# Patient Record
Sex: FEMALE | Race: OTHER | HISPANIC OR LATINO | Employment: PART TIME | URBAN - METROPOLITAN AREA
[De-identification: names, ages, dates, MRNs, and addresses within clinical notes are randomized per-mention and may not be internally consistent; named-entity substitution may affect disease eponyms.]

---

## 2018-02-20 ENCOUNTER — HOSPITAL ENCOUNTER (EMERGENCY)
Facility: HOSPITAL | Age: 23
Discharge: HOME | End: 2018-02-20
Admitting: EMERGENCY MEDICINE
Payer: COMMERCIAL

## 2018-02-20 VITALS
OXYGEN SATURATION: 99 % | TEMPERATURE: 98.1 F | DIASTOLIC BLOOD PRESSURE: 67 MMHG | SYSTOLIC BLOOD PRESSURE: 117 MMHG | WEIGHT: 191.25 LBS | RESPIRATION RATE: 20 BRPM | HEART RATE: 114 BPM

## 2018-02-20 DIAGNOSIS — J06.9 VIRAL UPPER RESPIRATORY ILLNESS: Primary | ICD-10-CM

## 2018-02-20 PROCEDURE — 99283 EMERGENCY DEPT VISIT LOW MDM: CPT

## 2018-02-20 RX ORDER — FLUTICASONE PROPIONATE 50 MCG
SPRAY, SUSPENSION (ML) NASAL
Qty: 16 G | Refills: 0 | Status: SHIPPED | OUTPATIENT
Start: 2018-02-20

## 2018-02-20 RX ORDER — LEVOTHYROXINE SODIUM 0.07 MG/1
75 TABLET ORAL DAILY
COMMUNITY

## 2018-02-20 RX ORDER — IBUPROFEN 600 MG/1
600 TABLET ORAL EVERY 6 HOURS PRN
Qty: 30 TABLET | Refills: 0 | Status: SHIPPED | OUTPATIENT
Start: 2018-02-20 | End: 2018-03-02

## 2018-02-20 RX ORDER — BENZONATATE 100 MG/1
100 CAPSULE ORAL 3 TIMES DAILY PRN
Qty: 20 CAPSULE | Refills: 0 | Status: SHIPPED | OUTPATIENT
Start: 2018-02-20 | End: 2018-02-27

## 2018-02-20 RX ORDER — GUAIFENESIN 600 MG
1200 TABLET, EXTENDED RELEASE 12 HR ORAL EVERY 12 HOURS SCHEDULED
Qty: 20 TABLET | Refills: 0 | Status: SHIPPED | OUTPATIENT
Start: 2018-02-20

## 2018-02-20 NOTE — DISCHARGE INSTRUCTIONS
Tylenol or Motrin for fevers/pain  Saline spray for congestion you may use Mucinex for cough and congestion increase, fluids follow-up with the family doctor  Return to the emergency department for worsening symptoms  Síndrome viral   LO QUE NECESITA SABER:   El síndrome viral es un término general usado para describir adelina infección viral que no tiene adelina causa definida  Los virus son propagados fácilmente de Alberteen Simeon persona a otra a través del aire y [place] los objetos que se comparten  INSTRUCCIONES SOBRE [facility]ITAL[facility]:   Llame al 911 en alba de presentar lo siguiente:   · Usted sufre adelina convulsión  · No es posible despertarlo  · Usted tiene dolor torácico y dificultad para respirar  Regrese a la erick de emergencias si:   · Usted tiene rigidez en el daniel, dolor de roselia intenso y sensibilidad a la rangel  · Usted se siente débil, mareado o confundido  · Usted paulie de orinar u orina menos de lo normal      · Usted tose vladimir o adelina mucosidad espesa amarilla o erna  · Usted tiene dolor abdominal severo o bey abdomen está más armen de lo habitual   Pregúntele a bey médico qué vitaminas y minerales son adecuados para usted  · Jocelynn síntomas no mejoran con el tratamiento o empeoran después de 3 días  · Usted tiene salpullido o dolor de oído  · Usted siente ardor al Jacqlyn Peels  · Usted tiene preguntas o inquietudes acerca de bey condición o cuidado  Medicamentos:  Es posible que usted necesite alguno de los siguientes:  · El acetaminofén  arun el dolor y baja la fiebre  Está disponible sin receta médica  Pregunte cuánto medicamento debe neil y con qué frecuencia  [address]  El acetaminofén puede causar daño en el hígado cuando no se carmen de forma correcta  · AINEs (Analgésicos antiinflamatorios no esteroides) candace el ibuprofeno, ayudan a disminuir la inflamación, el dolor y la [place]   Los AINEs pueden causar sangrado estomacal o problemas renales en ciertas personas  Si usted carmen un medicamento anticoagulante, siempre pregúntele a bey médico si los WANDY son seguros para usted  Siempre damian la etiqueta de dano medicamento y [place] instrucciones  · El medicamento para el resfriado  ayuda a disminuir la inflamación, a controlar la tos y a aliviar la congestión del pecho o nasal      · El rociador nasal de agua salina  ayuda a disminuir la congestión nasal      · Allegan prince medicamentos candace se le haya indicado  Consulte con bey médico si usted leonila que bey medicamento no le está ayudando o si presenta efectos secundarios  Infórmele si es alérgico a algún medicamento  Mantenga adelina lista actualizada de los [place], las vitaminas y los productos herbales que carmen  Incluya los siguientes datos de los medicamentos: cantidad, frecuencia y motivo de administración  Traiga con usted la lista o los envases de la píldoras a prince citas de seguimiento  Lleve la lista de los medicamentos con usted en alba de adelina emergencia  El manejo de bey síntomas:   · Consuma líquidos según le indicaron  para evitar la deshidratación  Pregunte cuánto líquido debe neil cada día y cuáles líquidos son los más adecuados para usted  Pregunte si usted debería neil adelina solución de rehidratación oral (SRO)  [address] cantidades exactas de agua, sal y azúcar que usted necesita para reemplazar los líquidos corporales  [place] podría ayudarlo a evitar la deshidratación a causa del vómito y de la diarrea  No tome líquidos con cafeína  Los líquidos con cafeína pueden empeorar la deshidratación  · Descanse lo suficiente  para ayudar a que bey cuerpo sane  Allegan siestas mani el día  Pregunte a bey médico cuándo puede regresar a bey trabajo y a prince actividades cotidianas  · Use un humidificador de suzie frío  para ayudarlo a respirar más fácilmente si usted tiene congestión nasal o del pecho  Pregunte a bey médico cómo usar un humidificador de vapor frío       · Coma miel o use caramelos para la tos  para ayudar a disminuir la molestia de la garganta  Pregunte a vasques médico cuánta miel debería comer cada día  Los caramelos para la tos están disponibles sin receta médica  Siga las indicaciones para neil los caramelos para la tos  · No fume y permanezca lejos de otras personas que fuman  La nicotina y otras sustancias químicas que contienen los cigarrillos y cigarros pueden dañar los pulmones  El fumar también puede retrasar la sanación  Pida información a vasques médico si usted actualmente fuma y necesita ayuda para dejar de fumar  Los cigarrillos electrónicos o tabaco sin humo todavía contienen nicotina  Consulte con vasques médico antes de [employer]  · Lávese las socrates frecuentemente  para evitar la propagación de gérmenes a otras personas  Utilice agua y [place]  Use gel antibacterial cuando no tenga jabón ni agua disponibles  [employer] las socraets después de usar el baño, toser o estornudar  Lávese las socrates antes de comer o preparar alimentos  Acuda a prince consultas de control con vasques médico según le indicaron  Anote prince preguntas para que se acuerde de hacerlas mani prince visitas  © 2017 [address]  Information is for End User's use only and may not be sold, redistributed or otherwise used for commercial purposes  All illustrations and images included in CareNotes® are the copyrighted property of A D A M , Inc  or Luis Alfredo Herr  Esta información es sólo para uso en educación  Vasques intención no es darle un consejo médico sobre enfermedades o tratamientos  Colsulte con vasques Burlene [place] farmacéutico antes de seguir cualquier régimen médico para saber si es seguro y efectivo para usted  Infección respiratoria superior   LO QUE NECESITA SABER:   Alisa infección respiratoria superior también se conoce candace el resfriado común  Micki es alisa infección que puede afectar vasques nariz, garganta, oídos y los senos frontales   Para personas saludables, el resfriado común generalmente no es grave y no requiere tratamiento especial  Los síntomas del resfriado generalmente son Suellyn Sjogren graves mani los primeros 3 a 5 días  [address] en 7 a 14 días  Puede que usted siga tosiendo por 2 a 3 semanas  Los resfriados son provocados por virus y no mejoran con antibióticos  INSTRUCCIONES SOBRE [facility]ITAL[facility]:   Regrese a la erick de emergencias si:   · Usted tiene dolor torácico y dificultad para respirar  Pregúntele a bey Tiffany Pel vitaminas y minerales son adecuados para usted  · Usted tiene fiebre de más de 102ºF [facility])  · Bey garganta irritada empeora o usted ve manchas chantal o hugo en bey garganta  · Jocelynn síntomas empeoran después de 3 a 5 días o bey resfriado no mejora en 14 días  · Usted tiene sarpullido en alguna parte de bey piel  · Usted tiene bultos grandes y sensible en bey daniel    · Usted tiene secreción espesa de color erna o amarillo proveniente de bey nariz  · Usted expectora mucosidad de color amarillo, erna o con [place]  · Usted vomita por más de 24 horas y no puede retener líquidos en el estómago  · Usted tiene un grave dolor de oído  · Usted tiene preguntas o inquietudes acerca de bey condición o cuidado  Medicamentos:  Es posible que usted necesite alguno de los siguientes:  · Los descongestionantes  ayudan a reducir la congestión nasal y Jessenia Ganser a respirar más fácilmente  Si carmen pastillas descongestionantes, pueden causarle agitación o problemas para dormir  No use aerosol descongestionante por más de unos cuantos días  · Los jarabes para la tos  ayudan a reducir la tos  Pregúntele a bey médico cuál tipo de medicamento para la tos es mejor para usted  · AINEs (Analgésicos antiinflamatorios no esteroides) candace el ibuprofeno, ayudan a disminuir la inflamación, el dolor y la [place]  Los AINEs pueden causar sangrado estomacal o problemas renales en ciertas personas   Si usted carmen un medicamento anticoagulante, siempre pregúntele a bey médico si los WANDY son seguros para usted  Siempre kavya la etiqueta de dano medicamento y [place] instrucciones  · Acetaminofeno:  arun el dolor y baja la fiebre  Está disponible sin receta médica  Pregunte la cantidad y la frecuencia con que debe tomarlos  [address]  Kavya las etiquetas de todos los demás medicamentos que esté usando para saber si también contienen acetaminofén, o pregunte a bey médico o farmacéutico  El acetaminofén puede causar daño en el hígado cuando no se carmen de forma correcta  No use más de 4 gramos (4000 miligramos) en total de acetaminofeno en un día  · Speed prince medicamentos candace se le haya indicado  Consulte con bey médico si usted leonila que bey medicamento no le está ayudando o si presenta efectos secundarios  Infórmele si es alérgico a cualquier medicamento  Mantenga adelina lista actualizada de los [place], las vitaminas y los productos herbales que carmen  Incluya los siguientes datos de los medicamentos: cantidad, frecuencia y motivo de administración  Traiga con usted la lista o los envases de la píldoras a prince citas de seguimiento  Lleve la lista de los medicamentos con usted en alba de adelina emergencia  Acuda a prince consultas de control con bey médico según le indicaron  Anote prince preguntas para que se acuerde de hacerlas mani prince visitas  Cuidados personales:   · Descanse el mayor tiempo posible  Empiece a hacer un poco más día a día  · [employer] líquidos candace se le indique  Los líquidos le ayudarán a aflojar y disolver la mucosidad para que la pueda expectorar  Los líquidos ayudarán a evitar la deshidratación  Los líquidos que ayudan a prevenir la deshidratación pueden ser [place] y caldo  No tome líquidos que contienen cafeína  La cafeína puede aumentar el riesgo de deshidratación  Pregúntele a bey médico cuál es la cantidad de líquido que necesita ingerir a diario  · Alivie el dolor de garganta  Cassidy gárgaras de agua tibia con sal  Primera ayuda a aliviar el dolor de garganta  Prepare agua salina disolviendo ¼ de cucharada de sal a 1 taza de agua tibia  Usted puede también chupar dulces duros o pastillas para la garganta  Usted puede usar aerosol para el dolor de garganta  · Use un humidificador o vaporizador  Use un humidificador de suzie frío o un vaporizador para elevar la humedad en bey casa  Primera podría ayudarle a respirar más fácilmente y al mismo tiempo disminuir la tos  · Use gotas nasales de solución salina candace se le haya indicado  Estas ayudan a [place]  · Aplique vaselina en la parte externa alrededor de las fosas nasales  Esta puede disminuir la irritación de sonarse la nariz  · No fume  La nicotina y otros químicos en los cigarrillos y cigarros pueden empeorar prince síntomas  También pueden causar infecciones candace la bronquitis o la [place]  Pida información a bey médico si usted actualmente fuma y necesita ayuda para dejar de fumar  Los cigarrillos electrónicos o tabaco sin humo todavía contienen nicotina  Consulte con bey médico antes de [employer]  Evite transmitir bey resfriado a los demás:   · Trate de mantenerse alejado de otras personas mani los primeros 2 a 3 días de bey resfriado cuando éste es más fácil de transmitir  · No comparta alimentos o bebidas  · No comparta toallas de mano con otros miembros de zeny en el hogar  · [employer] frecuentemente, especialmente después de sonarse la nariz  Karan la espalda a otras personas y cúbrase la boca y la nariz con un pañuelo desechable cuando estornuda o tose  © 2017 [address] [place] Information is for End User's use only and may not be sold, redistributed or otherwise used for commercial purposes  All illustrations and images included in CareNotes® are the copyrighted property of A D A M , Inc  or Luis Alfredo Herr    Esta información es sólo para uso en educación  Bey intención no es darle un consejo médico sobre enfermedades o tratamientos  Colsulte con bey Alejandro Sen farmacéutico antes de seguir cualquier régimen médico para saber si es seguro y efectivo para usted

## 2018-02-20 NOTE — ED PROVIDER NOTES
History  Chief Complaint   Patient presents with    Cough     Started one week ago  Patient presents to the emergency department with upper respiratory symptoms for 1 week  Patient states that she is coughing congestion and has a mild sore throat  She has not taken anything for her symptoms  She is not having any fevers  No chest pain or abdominal pain no GI upset eating and drinking well  Last menstrual cycle was on the 4th and denies chance of pregnancy  Prior to Admission Medications   Prescriptions Last Dose Informant Patient Reported? Taking?   levothyroxine 75 mcg tablet   Yes Yes   Sig: Take 75 mcg by mouth daily      Facility-Administered Medications: None       Past Medical History:   Diagnosis Date    Disease of thyroid gland     Hypothyroidism        History reviewed  No pertinent surgical history  History reviewed  No pertinent family history  I have reviewed and agree with the history as documented  Social History   Substance Use Topics    Smoking status: Never Smoker    Smokeless tobacco: Never Used    Alcohol use No        Review of Systems   All other systems reviewed and are negative  Physical Exam  ED Triage Vitals [02/20/18 1133]   Temperature Pulse Respirations Blood Pressure SpO2   98 1 °F (36 7 °C) (!) 114 20 117/67 99 %      Temp Source Heart Rate Source Patient Position - Orthostatic VS BP Location FiO2 (%)   Oral -- Sitting Right arm --      Pain Score       No Pain           Orthostatic Vital Signs  Vitals:    02/20/18 1133   BP: 117/67   Pulse: (!) 114   Patient Position - Orthostatic VS: Sitting       Physical Exam   Constitutional: She is oriented to person, place, and time  She appears well-developed and well-nourished  HENT:   Head: Normocephalic and atraumatic     Right Ear: External ear normal    Left Ear: External ear normal    Mouth/Throat: Oropharynx is clear and moist    Clear nasal rhinorrhea   Eyes: Conjunctivae and EOM are normal  Neck: Neck supple  Cardiovascular: Normal rate, regular rhythm and normal heart sounds  Pulmonary/Chest: Effort normal and breath sounds normal    Abdominal: Soft  Neurological: She is alert and oriented to person, place, and time  Skin: Skin is warm  Psychiatric: She has a normal mood and affect  Her behavior is normal    Nursing note and vitals reviewed  ED Medications  Medications - No data to display    Diagnostic Studies  Results Reviewed     None                 No orders to display              Procedures  Procedures       Phone Contacts  ED Phone Contact    ED Course  ED Course                                MDM  Number of Diagnoses or Management Options  Viral upper respiratory illness: new and does not require workup  Patient Progress  Patient progress: stable    CritCare Time    Disposition  Final diagnoses:   Viral upper respiratory illness     Time reflects when diagnosis was documented in both MDM as applicable and the Disposition within this note     Time User Action Codes Description Comment    2/20/2018 12:33 PM Jenn Chang [[id number],  [id number]] Viral upper respiratory illness       ED Disposition     ED Disposition Condition Comment    Discharge  Alina Valverde discharge to home/self care      Condition at discharge: Good        Follow-up Information     Follow up With Specialties Details Why Contact Info    [facility]    [phone]          Patient's Medications   Discharge Prescriptions    BENZONATATE (TESSALON PERLES) 100 MG CAPSULE    Take 1 capsule (100 mg total) by mouth 3 (three) times a day as needed for cough for up to 7 days       Start Date: 2/20/2018 End Date: 2/27/2018       Order Dose: 100 mg       Quantity: 20 capsule    Refills: 0    FLUTICASONE (FLONASE) 50 MCG/ACT NASAL SPRAY    2 sprays in each nostril daily       Start Date: 2/20/2018 End Date: --       Order Dose: --       Quantity: 16 g    Refills: 0    GUAIFENESIN (MUCINEX) 600 MG 12 HR TABLET Take 2 tablets (1,200 mg total) by mouth every 12 (twelve) hours       Start Date: 2/20/2018 End Date: --       Order Dose: 1,200 mg       Quantity: 20 tablet    Refills: 0    IBUPROFEN (MOTRIN) 600 MG TABLET    Take 1 tablet (600 mg total) by mouth every 6 (six) hours as needed for mild pain for up to 10 days       Start Date: 2/20/2018 End Date: 3/2/2018       Order Dose: 600 mg       Quantity: 30 tablet    Refills: 0     No discharge procedures on file      ED Provider  Electronically Signed by           Shavonne Garcia PA-C  02/20/18 2688